# Patient Record
Sex: FEMALE | Race: WHITE | Employment: UNEMPLOYED | URBAN - METROPOLITAN AREA
[De-identification: names, ages, dates, MRNs, and addresses within clinical notes are randomized per-mention and may not be internally consistent; named-entity substitution may affect disease eponyms.]

---

## 2017-01-05 ENCOUNTER — POSTPARTUM (OUTPATIENT)
Dept: OBGYN CLINIC | Facility: CLINIC | Age: 32
End: 2017-01-05

## 2017-01-05 VITALS
WEIGHT: 171 LBS | SYSTOLIC BLOOD PRESSURE: 134 MMHG | BODY MASS INDEX: 26 KG/M2 | HEART RATE: 71 BPM | DIASTOLIC BLOOD PRESSURE: 85 MMHG

## 2017-01-05 RX ORDER — ASCORBIC ACID, CALCIUM CITRATE, IRON, VITAMIN D, DL- ALPHA- TOCOPHEROL ACETATE, THIAMINE, RIBOFLAVIN, NIACINAMIDE, PYRIDOXINE HYDROCHLORIDE, FOLIC ACID, IODINE, ZINC, COPPER, DOCUSATE SODIUM, DOCONEXENT AND ICOSAPENT
KIT
COMMUNITY
Start: 2017-01-04 | End: 2018-03-14 | Stop reason: ALTCHOICE

## 2017-01-06 ENCOUNTER — TELEPHONE (OUTPATIENT)
Dept: OBGYN CLINIC | Facility: CLINIC | Age: 32
End: 2017-01-06

## 2017-01-06 NOTE — PROGRESS NOTES
HPI:   Severiano Diener is a 27year old  who presents for a 2 week Postpartum visit. Pt accompanied by FOB and baby. Reports adapting well and coping well. Is very happy with her birth experience. Breastfeeding successfully.  No strain in relati not apply Misc Please supply patient with double electric breast pump covered by her insurance.  Disp: 1 each Rfl: 0      Past Medical History   Diagnosis Date   • Decorative tattoo    • History of chicken pox      age 10   • Human papilloma virus infection deferred      ASSESSMENT AND PLAN:     Advised to continue breastfeeding  Counseled on continued napping during day, and waiting to start exercise or sexual activity until 6 weeks PP  Advised to continue with PNV and Vitamin D3 2000 IU daily while nursing

## 2017-02-07 ENCOUNTER — POSTPARTUM (OUTPATIENT)
Dept: OBGYN CLINIC | Facility: CLINIC | Age: 32
End: 2017-02-07

## 2017-02-07 VITALS
SYSTOLIC BLOOD PRESSURE: 118 MMHG | BODY MASS INDEX: 25.61 KG/M2 | HEART RATE: 72 BPM | DIASTOLIC BLOOD PRESSURE: 79 MMHG | WEIGHT: 165.13 LBS | HEIGHT: 67.5 IN

## 2017-02-07 DIAGNOSIS — R10.2 PERINEAL PAIN: Primary | ICD-10-CM

## 2017-02-07 PROCEDURE — 99213 OFFICE O/P EST LOW 20 MIN: CPT | Performed by: ADVANCED PRACTICE MIDWIFE

## 2017-02-07 NOTE — PROGRESS NOTES
Patient here for postpartum check-up. Vaginal delivery @ 6 weeks ago with CNM . Breastfeeding exclusively, on demand. Baby with adequate weight gain. Denies fevers, chills, body aches and flu-like symptoms.   Denies abdominal pain, n, reports no vaginal b

## 2017-02-27 ENCOUNTER — NURSE ONLY (OUTPATIENT)
Dept: LACTATION | Facility: HOSPITAL | Age: 32
End: 2017-02-27
Payer: COMMERCIAL

## 2017-02-27 PROCEDURE — 99212 OFFICE O/P EST SF 10 MIN: CPT

## 2017-02-27 NOTE — PROGRESS NOTES
Mom is here today because she started weaning infant from nipple shield at the breast. Infant fed well at the breast on Friday without a nipple shield, however, on Saturday, Mom c/o pain with feeding and infant not wanting to stay latched to the breast. At

## 2017-04-06 ENCOUNTER — TELEPHONE (OUTPATIENT)
Dept: OBGYN CLINIC | Facility: CLINIC | Age: 32
End: 2017-04-06

## 2017-04-06 NOTE — TELEPHONE ENCOUNTER
Pt just weaned infant from nipple shield 2 weeks ago. Now feeling more discomfort with nursing & has blisters on her nipples, right worse that left. Encouraged pt to ensure infant has correct latch, as that can cause blistering.  Pt aware of what a correct

## 2017-04-06 NOTE — TELEPHONE ENCOUNTER
Per the pt she has a milk blister on her nipple, and would like to speak with a nurse. Please advise.

## 2017-04-07 ENCOUNTER — LAB ENCOUNTER (OUTPATIENT)
Dept: LAB | Facility: HOSPITAL | Age: 32
End: 2017-04-07
Attending: OBSTETRICS & GYNECOLOGY
Payer: COMMERCIAL

## 2017-04-07 DIAGNOSIS — Z34.03 ENCOUNTER FOR SUPERVISION OF NORMAL FIRST PREGNANCY IN THIRD TRIMESTER: ICD-10-CM

## 2017-04-07 DIAGNOSIS — O26.892 RH NEGATIVE STATE IN ANTEPARTUM PERIOD, SECOND TRIMESTER: ICD-10-CM

## 2017-04-07 DIAGNOSIS — Z67.91 RH NEGATIVE STATE IN ANTEPARTUM PERIOD, SECOND TRIMESTER: ICD-10-CM

## 2017-04-07 PROCEDURE — 36415 COLL VENOUS BLD VENIPUNCTURE: CPT

## 2017-04-07 PROCEDURE — 86901 BLOOD TYPING SEROLOGIC RH(D): CPT

## 2017-04-07 PROCEDURE — 85025 COMPLETE CBC W/AUTO DIFF WBC: CPT

## 2017-04-07 PROCEDURE — 85730 THROMBOPLASTIN TIME PARTIAL: CPT

## 2017-04-07 PROCEDURE — 80053 COMPREHEN METABOLIC PANEL: CPT

## 2017-04-07 PROCEDURE — 85610 PROTHROMBIN TIME: CPT

## 2017-04-07 PROCEDURE — 86900 BLOOD TYPING SEROLOGIC ABO: CPT

## 2017-04-14 ENCOUNTER — SURGERY (OUTPATIENT)
Age: 32
End: 2017-04-14

## 2017-04-14 ENCOUNTER — HOSPITAL ENCOUNTER (OUTPATIENT)
Facility: HOSPITAL | Age: 32
Setting detail: HOSPITAL OUTPATIENT SURGERY
Discharge: HOME | End: 2017-04-14
Attending: OBSTETRICS & GYNECOLOGY | Admitting: OBSTETRICS & GYNECOLOGY
Payer: COMMERCIAL

## 2017-04-14 ENCOUNTER — ANESTHESIA EVENT (OUTPATIENT)
Dept: SURGERY | Facility: HOSPITAL | Age: 32
End: 2017-04-14

## 2017-04-14 ENCOUNTER — ANESTHESIA (OUTPATIENT)
Dept: SURGERY | Facility: HOSPITAL | Age: 32
End: 2017-04-14

## 2017-04-14 VITALS
DIASTOLIC BLOOD PRESSURE: 74 MMHG | HEART RATE: 75 BPM | BODY MASS INDEX: 24.55 KG/M2 | WEIGHT: 162 LBS | HEIGHT: 68 IN | TEMPERATURE: 98 F | OXYGEN SATURATION: 100 % | RESPIRATION RATE: 19 BRPM | SYSTOLIC BLOOD PRESSURE: 126 MMHG

## 2017-04-14 DIAGNOSIS — N87.9 CERVICAL DYSPLASIA: Primary | ICD-10-CM

## 2017-04-14 PROCEDURE — 81025 URINE PREGNANCY TEST: CPT

## 2017-04-14 PROCEDURE — 0UJH8ZZ INSPECTION OF VAGINA AND CUL-DE-SAC, VIA NATURAL OR ARTIFICIAL OPENING ENDOSCOPIC: ICD-10-PCS | Performed by: OBSTETRICS & GYNECOLOGY

## 2017-04-14 PROCEDURE — 88307 TISSUE EXAM BY PATHOLOGIST: CPT | Performed by: OBSTETRICS & GYNECOLOGY

## 2017-04-14 PROCEDURE — 0UBC7ZX EXCISION OF CERVIX, VIA NATURAL OR ARTIFICIAL OPENING, DIAGNOSTIC: ICD-10-PCS | Performed by: OBSTETRICS & GYNECOLOGY

## 2017-04-14 PROCEDURE — 88305 TISSUE EXAM BY PATHOLOGIST: CPT | Performed by: OBSTETRICS & GYNECOLOGY

## 2017-04-14 RX ORDER — MIDAZOLAM HYDROCHLORIDE 1 MG/ML
INJECTION INTRAMUSCULAR; INTRAVENOUS AS NEEDED
Status: DISCONTINUED | OUTPATIENT
Start: 2017-04-14 | End: 2017-04-14 | Stop reason: SURG

## 2017-04-14 RX ORDER — ONDANSETRON 2 MG/ML
4 INJECTION INTRAMUSCULAR; INTRAVENOUS EVERY 8 HOURS PRN
Status: DISCONTINUED | OUTPATIENT
Start: 2017-04-14 | End: 2017-04-14

## 2017-04-14 RX ORDER — FERRIC SUBSULFATE 20-22G/100
SOLUTION, NON-ORAL MISCELLANEOUS AS NEEDED
Status: DISCONTINUED | OUTPATIENT
Start: 2017-04-14 | End: 2017-04-14 | Stop reason: HOSPADM

## 2017-04-14 RX ORDER — METOCLOPRAMIDE 10 MG/1
10 TABLET ORAL ONCE
Status: DISCONTINUED | OUTPATIENT
Start: 2017-04-14 | End: 2017-04-14 | Stop reason: HOSPADM

## 2017-04-14 RX ORDER — SODIUM CHLORIDE, SODIUM LACTATE, POTASSIUM CHLORIDE, CALCIUM CHLORIDE 600; 310; 30; 20 MG/100ML; MG/100ML; MG/100ML; MG/100ML
INJECTION, SOLUTION INTRAVENOUS CONTINUOUS
Status: DISCONTINUED | OUTPATIENT
Start: 2017-04-14 | End: 2017-04-14

## 2017-04-14 RX ORDER — ACETAMINOPHEN 325 MG/1
650 TABLET ORAL ONCE
Status: COMPLETED | OUTPATIENT
Start: 2017-04-14 | End: 2017-04-14

## 2017-04-14 RX ORDER — HALOPERIDOL 5 MG/ML
0.25 INJECTION INTRAMUSCULAR ONCE AS NEEDED
Status: DISCONTINUED | OUTPATIENT
Start: 2017-04-14 | End: 2017-04-14

## 2017-04-14 RX ORDER — DEXAMETHASONE SODIUM PHOSPHATE 4 MG/ML
VIAL (ML) INJECTION AS NEEDED
Status: DISCONTINUED | OUTPATIENT
Start: 2017-04-14 | End: 2017-04-14 | Stop reason: SURG

## 2017-04-14 RX ORDER — HYDROMORPHONE HYDROCHLORIDE 1 MG/ML
0.4 INJECTION, SOLUTION INTRAMUSCULAR; INTRAVENOUS; SUBCUTANEOUS EVERY 5 MIN PRN
Status: DISCONTINUED | OUTPATIENT
Start: 2017-04-14 | End: 2017-04-14

## 2017-04-14 RX ORDER — FAMOTIDINE 20 MG/1
20 TABLET ORAL ONCE
Status: DISCONTINUED | OUTPATIENT
Start: 2017-04-14 | End: 2017-04-14 | Stop reason: HOSPADM

## 2017-04-14 RX ORDER — KETOROLAC TROMETHAMINE 30 MG/ML
INJECTION, SOLUTION INTRAMUSCULAR; INTRAVENOUS AS NEEDED
Status: DISCONTINUED | OUTPATIENT
Start: 2017-04-14 | End: 2017-04-14 | Stop reason: SURG

## 2017-04-14 RX ORDER — ONDANSETRON 2 MG/ML
4 INJECTION INTRAMUSCULAR; INTRAVENOUS ONCE AS NEEDED
Status: DISCONTINUED | OUTPATIENT
Start: 2017-04-14 | End: 2017-04-14

## 2017-04-14 RX ORDER — NALOXONE HYDROCHLORIDE 0.4 MG/ML
80 INJECTION, SOLUTION INTRAMUSCULAR; INTRAVENOUS; SUBCUTANEOUS AS NEEDED
Status: DISCONTINUED | OUTPATIENT
Start: 2017-04-14 | End: 2017-04-14

## 2017-04-14 RX ORDER — MORPHINE SULFATE 4 MG/ML
4 INJECTION, SOLUTION INTRAMUSCULAR; INTRAVENOUS EVERY 10 MIN PRN
Status: DISCONTINUED | OUTPATIENT
Start: 2017-04-14 | End: 2017-04-14

## 2017-04-14 RX ORDER — ONDANSETRON 2 MG/ML
INJECTION INTRAMUSCULAR; INTRAVENOUS AS NEEDED
Status: DISCONTINUED | OUTPATIENT
Start: 2017-04-14 | End: 2017-04-14 | Stop reason: SURG

## 2017-04-14 RX ORDER — MORPHINE SULFATE 2 MG/ML
2 INJECTION, SOLUTION INTRAMUSCULAR; INTRAVENOUS EVERY 10 MIN PRN
Status: DISCONTINUED | OUTPATIENT
Start: 2017-04-14 | End: 2017-04-14

## 2017-04-14 RX ORDER — LIDOCAINE HYDROCHLORIDE 10 MG/ML
INJECTION, SOLUTION EPIDURAL; INFILTRATION; INTRACAUDAL; PERINEURAL AS NEEDED
Status: DISCONTINUED | OUTPATIENT
Start: 2017-04-14 | End: 2017-04-14 | Stop reason: SURG

## 2017-04-14 RX ORDER — GLYCOPYRROLATE 0.2 MG/ML
INJECTION INTRAMUSCULAR; INTRAVENOUS AS NEEDED
Status: DISCONTINUED | OUTPATIENT
Start: 2017-04-14 | End: 2017-04-14 | Stop reason: SURG

## 2017-04-14 RX ORDER — MORPHINE SULFATE 10 MG/ML
6 INJECTION, SOLUTION INTRAMUSCULAR; INTRAVENOUS EVERY 10 MIN PRN
Status: DISCONTINUED | OUTPATIENT
Start: 2017-04-14 | End: 2017-04-14

## 2017-04-14 RX ORDER — HYDROCODONE BITARTRATE AND ACETAMINOPHEN 5; 325 MG/1; MG/1
2 TABLET ORAL AS NEEDED
Status: COMPLETED | OUTPATIENT
Start: 2017-04-14 | End: 2017-04-14

## 2017-04-14 RX ORDER — SODIUM CHLORIDE, SODIUM LACTATE, POTASSIUM CHLORIDE, CALCIUM CHLORIDE 600; 310; 30; 20 MG/100ML; MG/100ML; MG/100ML; MG/100ML
INJECTION, SOLUTION INTRAVENOUS CONTINUOUS PRN
Status: DISCONTINUED | OUTPATIENT
Start: 2017-04-14 | End: 2017-04-14 | Stop reason: SURG

## 2017-04-14 RX ORDER — ONDANSETRON 4 MG/1
4 TABLET, FILM COATED ORAL EVERY 8 HOURS PRN
Status: DISCONTINUED | OUTPATIENT
Start: 2017-04-14 | End: 2017-04-14

## 2017-04-14 RX ORDER — HYDROMORPHONE HYDROCHLORIDE 1 MG/ML
0.6 INJECTION, SOLUTION INTRAMUSCULAR; INTRAVENOUS; SUBCUTANEOUS EVERY 5 MIN PRN
Status: DISCONTINUED | OUTPATIENT
Start: 2017-04-14 | End: 2017-04-14

## 2017-04-14 RX ORDER — HYDROMORPHONE HYDROCHLORIDE 1 MG/ML
0.2 INJECTION, SOLUTION INTRAMUSCULAR; INTRAVENOUS; SUBCUTANEOUS EVERY 5 MIN PRN
Status: DISCONTINUED | OUTPATIENT
Start: 2017-04-14 | End: 2017-04-14

## 2017-04-14 RX ORDER — IODINE SOLUTION STRONG 5% (LUGOL'S) 5 %
SOLUTION ORAL AS NEEDED
Status: DISCONTINUED | OUTPATIENT
Start: 2017-04-14 | End: 2017-04-14 | Stop reason: HOSPADM

## 2017-04-14 RX ORDER — ACETIC ACID 0.25 G/100ML
IRRIGANT IRRIGATION AS NEEDED
Status: DISCONTINUED | OUTPATIENT
Start: 2017-04-14 | End: 2017-04-14 | Stop reason: HOSPADM

## 2017-04-14 RX ORDER — SODIUM CHLORIDE, SODIUM LACTATE, POTASSIUM CHLORIDE, CALCIUM CHLORIDE 600; 310; 30; 20 MG/100ML; MG/100ML; MG/100ML; MG/100ML
INJECTION, SOLUTION INTRAVENOUS CONTINUOUS PRN
Status: DISCONTINUED | OUTPATIENT
Start: 2017-04-14 | End: 2017-04-14

## 2017-04-14 RX ORDER — HYDROCODONE BITARTRATE AND ACETAMINOPHEN 5; 325 MG/1; MG/1
1 TABLET ORAL AS NEEDED
Status: COMPLETED | OUTPATIENT
Start: 2017-04-14 | End: 2017-04-14

## 2017-04-14 RX ADMIN — ONDANSETRON 4 MG: 2 INJECTION INTRAMUSCULAR; INTRAVENOUS at 07:35:00

## 2017-04-14 RX ADMIN — MIDAZOLAM HYDROCHLORIDE 2 MG: 1 INJECTION INTRAMUSCULAR; INTRAVENOUS at 07:32:00

## 2017-04-14 RX ADMIN — DEXAMETHASONE SODIUM PHOSPHATE 4 MG: 4 MG/ML VIAL (ML) INJECTION at 07:35:00

## 2017-04-14 RX ADMIN — SODIUM CHLORIDE, SODIUM LACTATE, POTASSIUM CHLORIDE, CALCIUM CHLORIDE: 600; 310; 30; 20 INJECTION, SOLUTION INTRAVENOUS at 07:30:00

## 2017-04-14 RX ADMIN — LIDOCAINE HYDROCHLORIDE 25 MG: 10 INJECTION, SOLUTION EPIDURAL; INFILTRATION; INTRACAUDAL; PERINEURAL at 07:35:00

## 2017-04-14 RX ADMIN — GLYCOPYRROLATE 0.2 MG: 0.2 INJECTION INTRAMUSCULAR; INTRAVENOUS at 07:35:00

## 2017-04-14 RX ADMIN — SODIUM CHLORIDE, SODIUM LACTATE, POTASSIUM CHLORIDE, CALCIUM CHLORIDE: 600; 310; 30; 20 INJECTION, SOLUTION INTRAVENOUS at 08:31:00

## 2017-04-14 RX ADMIN — KETOROLAC TROMETHAMINE 30 MG: 30 INJECTION, SOLUTION INTRAMUSCULAR; INTRAVENOUS at 08:21:00

## 2017-04-14 NOTE — ANESTHESIA POSTPROCEDURE EVALUATION
Patient: Cristi Duane    Procedure Summary     Date Anesthesia Start Anesthesia Stop Room / Location    04/14/17 0730  [facility] MAIN OR 08 / [address] [facility] MAIN OR       Procedure Diagnosis Surgeon Responsible Provider    PELVIC EXAM (N/A ); CERVIX BIOPSY (N/A ) (c

## 2017-04-14 NOTE — ANESTHESIA PREPROCEDURE EVALUATION
Anesthesia PreOp Note    HPI:     Claudio Gonzalez is a 27year old female who presents for preoperative consultation requested by: Gary Taylor DO    Date of Surgery: 4/14/2017    Procedure(s):  PELVIC EXAM  CERVIX BIOPSY  Indication: cervical dys mL/hr at 04/14/17 0625   [MAR Hold] famoTIDine (PEPCID) tab 20 mg 20 mg Oral Once David Paddy, DO 20 mg at 04/14/17 0600   [MAR Hold] Metoclopramide HCl (REGLAN) tab 10 mg 10 mg Oral Once David Paddy, DO 10 mg at 04/14/17 0600   [MAR Hold] CeFAZo Sexual Activity: Not on file   Not on file  Other Topics Concern    Blood Transfusions No    Caffeine Concern No    Comment: 1-2 cup coffee    Occupational Exposure No    Weight Concern No    Special Diet No    Comment: recently added daily back into he General  Airway:  LMA  Post-op Pain Management: IV analgesics  Informed Consent Plan and Risks Discussed With:  Patient and spouse  Discussed plan with:  Attending and surgeon  Provider Attestation (if preop done by other):  Patient seen, postpartum , br

## 2017-04-14 NOTE — INTERVAL H&P NOTE
Pre-op Diagnosis: cervical dysplasia    The above referenced H&P was reviewed by Cora Guzman DO on 4/14/2017, the patient was examined and no significant changes have occurred in the patient's condition since the H&P was performed.   I discussed with shellie was performed with any pertinent positives noted in the HPI and otherwise negative.     VITAL SIGNS:    Blood pressure: 120/78, Pulse: 70, Temperature: , Respirations: , O2 sat: , Pain Scale: 0, Height: 68 in, Weight: 168 lb, BSA: 1.9, BMI: 25.54 kg/m2    P for cervical cerclage in the future secondary to procedure. I reviewed other risks and benefits associated with this. I discussed that she cannot breast feed while on Narcotics.  I advised that after the [facility], she should wait at least 6 months to become preg

## 2017-04-14 NOTE — BRIEF OP NOTE
[address] UNIT  Brief Op Note     Johanna Staley Location: OR   [facility] [id number] MRN [id number]   Admission Date 4/14/2017 Operation Date 4/14/2017   Attending Physician Delgado Doe, [address] [place] [address] Operating Physician Margarita Castano

## 2017-04-15 NOTE — OPERATIVE REPORT
[facility]    PATIENT'S NAME: [facility]   ATTENDING PHYSICIAN: Miguelito Mirza DO   OPERATING PHYSICIAN: Miguelito Mirza DO   PATIENT ACCOUNT#:   [de-identified]    LOCATION:  [place] [address]  MEDICAL RECORD #:   [id number]       BRYSON the way around. Next, the specimen was then grasped with an Allis clamp and curved Flores scissors were then used to transect as small a lesion as possible of the endo and ectocervix, given the fact that the patient so wanted to have more children.   This sp

## 2017-06-15 PROCEDURE — 81015 MICROSCOPIC EXAM OF URINE: CPT | Performed by: INTERNAL MEDICINE

## 2017-06-15 PROCEDURE — 87186 SC STD MICRODIL/AGAR DIL: CPT | Performed by: INTERNAL MEDICINE

## 2017-06-15 PROCEDURE — 87086 URINE CULTURE/COLONY COUNT: CPT | Performed by: INTERNAL MEDICINE

## 2017-06-15 PROCEDURE — 87077 CULTURE AEROBIC IDENTIFY: CPT | Performed by: INTERNAL MEDICINE

## 2017-09-06 ENCOUNTER — TELEPHONE (OUTPATIENT)
Dept: OBGYN CLINIC | Facility: CLINIC | Age: 32
End: 2017-09-06

## 2017-12-26 ENCOUNTER — OFFICE VISIT (OUTPATIENT)
Dept: OBGYN CLINIC | Facility: CLINIC | Age: 32
End: 2017-12-26

## 2017-12-26 ENCOUNTER — TELEPHONE (OUTPATIENT)
Dept: OBGYN CLINIC | Facility: CLINIC | Age: 32
End: 2017-12-26

## 2017-12-26 ENCOUNTER — TELEPHONE (OUTPATIENT)
Dept: PEDIATRICS CLINIC | Facility: CLINIC | Age: 32
End: 2017-12-26

## 2017-12-26 VITALS
HEIGHT: 68 IN | WEIGHT: 146.25 LBS | HEART RATE: 87 BPM | BODY MASS INDEX: 22.17 KG/M2 | SYSTOLIC BLOOD PRESSURE: 119 MMHG | DIASTOLIC BLOOD PRESSURE: 78 MMHG

## 2017-12-26 DIAGNOSIS — Z98.890 HISTORY OF LOOP ELECTRICAL EXCISION PROCEDURE (LEEP): ICD-10-CM

## 2017-12-26 DIAGNOSIS — N92.6 MISSED MENSES: Primary | ICD-10-CM

## 2017-12-26 DIAGNOSIS — O09.891 SHORT INTERVAL BETWEEN PREGNANCIES AFFECTING PREGNANCY IN FIRST TRIMESTER, ANTEPARTUM: ICD-10-CM

## 2017-12-26 PROCEDURE — 81025 URINE PREGNANCY TEST: CPT | Performed by: ADVANCED PRACTICE MIDWIFE

## 2017-12-26 PROCEDURE — 99213 OFFICE O/P EST LOW 20 MIN: CPT | Performed by: ADVANCED PRACTICE MIDWIFE

## 2017-12-26 NOTE — TELEPHONE ENCOUNTER
Pt states she was taking Citranatal Assure with her last pregnancy and was happy with this formula. Pt was advised we recommend pt's  OTC PN Vitamin with DHA to take during pregnancy.   Pt was advised to check with the pharmacy to see which is more

## 2017-12-26 NOTE — PROGRESS NOTES
Pt is a  sure of lmp. Pt has concerns due to recent LEEP and short inter conceptualspacing.   1.  Discussed importance of folic acid, calcium, vitamin D.   2.  Reviewed pregnancy recommendations regarding weight gain, diet, fish consumption, consumptio

## 2018-01-23 ENCOUNTER — TELEPHONE (OUTPATIENT)
Dept: OBGYN CLINIC | Facility: CLINIC | Age: 33
End: 2018-01-23

## 2018-01-23 ENCOUNTER — NURSE ONLY (OUTPATIENT)
Dept: OBGYN CLINIC | Facility: CLINIC | Age: 33
End: 2018-01-23

## 2018-01-23 VITALS — WEIGHT: 136 LBS | BODY MASS INDEX: 21 KG/M2

## 2018-01-23 DIAGNOSIS — O34.41 HISTORY OF LOOP ELECTROSURGICAL EXCISION PROCEDURE (LEEP) OF CERVIX AFFECTING PREGNANCY IN FIRST TRIMESTER: ICD-10-CM

## 2018-01-23 DIAGNOSIS — Z98.890 HISTORY OF LOOP ELECTROSURGICAL EXCISION PROCEDURE (LEEP) OF CERVIX AFFECTING PREGNANCY IN FIRST TRIMESTER: ICD-10-CM

## 2018-01-23 DIAGNOSIS — Z34.81 PRENATAL CARE, SUBSEQUENT PREGNANCY IN FIRST TRIMESTER: Primary | ICD-10-CM

## 2018-01-23 DIAGNOSIS — O09.891 SHORT INTERVAL BETWEEN PREGNANCIES AFFECTING PREGNANCY IN FIRST TRIMESTER, ANTEPARTUM: ICD-10-CM

## 2018-01-23 NOTE — TELEPHONE ENCOUNTER
Per pt she knows for fact she had a [facility] PC appt scheduled for today at 11am - she was never added to the schedule

## 2018-01-23 NOTE — PROGRESS NOTES
Nurse education complete via phone. Pt to  handouts & folder at next visit. Pt has a cat at home (does not clean litterbox). Toxo titers ordered w/ NO[facility] labs. Pt req to have lab work completed at outside lab. Orders printed & mailed to pt.  Address co

## 2018-02-05 ENCOUNTER — TELEPHONE (OUTPATIENT)
Dept: OBGYN CLINIC | Facility: CLINIC | Age: 33
End: 2018-02-05

## 2018-02-05 NOTE — TELEPHONE ENCOUNTER
Received call from pt who states for the past 10 min she is having issues with her vision. Pt states she has \"blurry vision and is seeing flickering lights. \" Pt denies SCHAEFER states she just feels pressure in the back of her head.  Pt denies numbness or weakn

## 2018-02-10 LAB
ABSOLUTE BASOPHILS: 16 CELLS/UL (ref 0–200)
ABSOLUTE EOSINOPHILS: 73 CELLS/UL (ref 15–500)
ABSOLUTE LYMPHOCYTES: 1580 CELLS/UL (ref 850–3900)
ABSOLUTE MONOCYTES: 437 CELLS/UL (ref 200–950)
ABSOLUTE NEUTROPHILS: 5994 CELLS/UL (ref 1500–7800)
BASOPHILS: 0.2 %
EOSINOPHILS: 0.9 %
HEMATOCRIT: 35.9 % (ref 35–45)
HEMOGLOBIN: 12 G/DL (ref 11.7–15.5)
LYMPHOCYTES: 19.5 %
MCH: 30.2 PG (ref 27–33)
MCHC: 33.4 G/DL (ref 32–36)
MCV: 90.4 FL (ref 80–100)
MONOCYTES: 5.4 %
MPV: 12.1 FL (ref 7.5–12.5)
NEUTROPHILS: 74 %
PLATELET COUNT: 214 THOUSAND/UL (ref 140–400)
RDW: 12.4 % (ref 11–15)
RED BLOOD CELL COUNT: 3.97 MILLION/UL (ref 3.8–5.1)
RUBELLA ANTIBODY (IGG): 1.69 INDEX
SIGNAL TO CUT-OFF: 0.02
T. PALLIDUM AB, EIA: NEGATIVE
TOXOPLASMA IGG ANTIBODY: <7.2 IU/ML
TOXOPLASMA IGM ANTIBODY: <8 AU/ML
WHITE BLOOD CELL COUNT: 8.1 THOUSAND/UL (ref 3.8–10.8)

## 2018-02-12 ENCOUNTER — TELEPHONE (OUTPATIENT)
Dept: OBGYN CLINIC | Facility: CLINIC | Age: 33
End: 2018-02-12

## 2018-02-14 ENCOUNTER — INITIAL PRENATAL (OUTPATIENT)
Dept: OBGYN CLINIC | Facility: CLINIC | Age: 33
End: 2018-02-14

## 2018-02-14 VITALS — DIASTOLIC BLOOD PRESSURE: 64 MMHG | BODY MASS INDEX: 23 KG/M2 | SYSTOLIC BLOOD PRESSURE: 112 MMHG | WEIGHT: 150.81 LBS

## 2018-02-14 DIAGNOSIS — Z34.81 ENCOUNTER FOR SUPERVISION OF OTHER NORMAL PREGNANCY IN FIRST TRIMESTER: Primary | ICD-10-CM

## 2018-02-14 DIAGNOSIS — A64 STI (SEXUALLY TRANSMITTED INFECTION): ICD-10-CM

## 2018-02-14 LAB
MULTISTIX LOT#: NORMAL NUMERIC
PH, URINE: 8 (ref 4.5–8)
SPECIFIC GRAVITY: 1.01 (ref 1–1.03)
URINE-COLOR: YELLOW
UROBILINOGEN,SEMI-QN: 0.2 MG/DL (ref 0–1.9)

## 2018-02-14 PROCEDURE — 81002 URINALYSIS NONAUTO W/O SCOPE: CPT | Performed by: ADVANCED PRACTICE MIDWIFE

## 2018-02-14 RX ORDER — CALCIUM CARBONATE/VITAMIN D3 600 MG-10
TABLET ORAL
Status: ON HOLD | COMMUNITY
Start: 2018-01-16 | End: 2018-09-08

## 2018-02-14 RX ORDER — CHOLECALCIFEROL (VITAMIN D3) 50 MCG
TABLET ORAL
COMMUNITY
Start: 2018-01-16

## 2018-02-14 NOTE — PROGRESS NOTES
NOB teaching reviewed. First trimester screen scheduled for Friday  Pt has increased nausea. Information sheets given, warning signs reviewed. Hx of LEEP  Cervical length measurement consulted on. Pt to discuss with MFM on Friday.   All questions answer

## 2018-02-15 LAB
C TRACH DNA SPEC QL NAA+PROBE: NEGATIVE
N GONORRHOEA DNA SPEC QL NAA+PROBE: NEGATIVE

## 2018-02-16 ENCOUNTER — HOSPITAL ENCOUNTER (OUTPATIENT)
Dept: PERINATAL CARE | Facility: HOSPITAL | Age: 33
Discharge: HOME | End: 2018-02-16
Attending: ADVANCED PRACTICE MIDWIFE
Payer: COMMERCIAL

## 2018-02-16 ENCOUNTER — HOSPITAL ENCOUNTER (OUTPATIENT)
Dept: PERINATAL CARE | Facility: HOSPITAL | Age: 33
Discharge: HOME | End: 2018-02-16
Attending: OBSTETRICS & GYNECOLOGY
Payer: COMMERCIAL

## 2018-02-16 VITALS — HEART RATE: 79 BPM | DIASTOLIC BLOOD PRESSURE: 58 MMHG | SYSTOLIC BLOOD PRESSURE: 137 MMHG

## 2018-02-16 DIAGNOSIS — Z36.9 FIRST TRIMESTER SCREENING: ICD-10-CM

## 2018-02-16 DIAGNOSIS — O09.891 SHORT INTERVAL BETWEEN PREGNANCIES AFFECTING PREGNANCY IN FIRST TRIMESTER, ANTEPARTUM: ICD-10-CM

## 2018-02-16 DIAGNOSIS — O34.40: ICD-10-CM

## 2018-02-16 DIAGNOSIS — Z80.43 FH: TESTICULAR CANCER: ICD-10-CM

## 2018-02-16 DIAGNOSIS — N87.1: ICD-10-CM

## 2018-02-16 DIAGNOSIS — Z98.890 HISTORY OF LOOP ELECTRICAL EXCISION PROCEDURE (LEEP): ICD-10-CM

## 2018-02-16 DIAGNOSIS — Z36.9 FIRST TRIMESTER SCREENING: Primary | ICD-10-CM

## 2018-02-16 PROCEDURE — 76813 OB US NUCHAL MEAS 1 GEST: CPT | Performed by: OBSTETRICS & GYNECOLOGY

## 2018-02-16 PROCEDURE — 99243 OFF/OP CNSLTJ NEW/EST LOW 30: CPT | Performed by: OBSTETRICS & GYNECOLOGY

## 2018-02-16 NOTE — PROGRESS NOTES
Outpatient Maternal-Fetal Medicine Consultation    Dear Ms. Ari carlson for requesting ultrasound evaluation and maternal fetal medicine consultation on your patient Óscar Hansen.   As you are aware she is a 34year old female with a Single Carbonate Antacid 1000 MG Oral Chew Tab, Chew 500 mg by mouth 2 (two) times daily.   , Disp: , Rfl:   Allergies: No Known Allergies      PHYSICAL EXAMINATION:  /58   Pulse 79   LMP 11/24/2017 (Exact Date)   General: alert and oriented,no acute distres short cervix. The data in twin pregnancies is not as promising but one study did show a trend toward lower  delivery with vaginal progesterone use.        I recommend a baseline cervical length ~ 16 weeks gestation then repeat assessment between 25- ajith Boothe M.D. The majority of the time (>50%) was spent in review of records, consultation and coordination of care. Our discussion is summarized above. The approximate physician face-to-face time was 40 minutes.

## 2018-02-20 LAB — HPV I/H RISK 1 DNA SPEC QL NAA+PROBE: NEGATIVE

## 2018-02-22 ENCOUNTER — TELEPHONE (OUTPATIENT)
Dept: PEDIATRICS CLINIC | Facility: CLINIC | Age: 33
End: 2018-02-22

## 2018-03-01 NOTE — TELEPHONE ENCOUNTER
Per [facility] pt's pap results are to be faxed to Dr Andre Hernandez. Pap results were faxed to Dr Andre Hernandez on 2/27/18.

## 2018-03-02 ENCOUNTER — TELEPHONE (OUTPATIENT)
Dept: PERINATAL CARE | Facility: HOSPITAL | Age: 33
End: 2018-03-02

## 2018-03-02 NOTE — TELEPHONE ENCOUNTER
Pt 1st trimester screen results obt  Reviewed By MD allen  Low risk for trisomy 18/13/21  Less than 1 in 34772 risk for trisomy 18/13/21    Report sent for scanning into pt record

## 2018-03-12 ENCOUNTER — TELEPHONE (OUTPATIENT)
Dept: OBGYN CLINIC | Facility: CLINIC | Age: 33
End: 2018-03-12

## 2018-03-14 ENCOUNTER — ROUTINE PRENATAL (OUTPATIENT)
Dept: OBGYN CLINIC | Facility: CLINIC | Age: 33
End: 2018-03-14

## 2018-03-14 VITALS
DIASTOLIC BLOOD PRESSURE: 62 MMHG | HEART RATE: 70 BPM | SYSTOLIC BLOOD PRESSURE: 102 MMHG | WEIGHT: 153.13 LBS | BODY MASS INDEX: 23.21 KG/M2 | HEIGHT: 68 IN

## 2018-03-14 DIAGNOSIS — Z34.82 PRENATAL CARE, SUBSEQUENT PREGNANCY, SECOND TRIMESTER: Primary | ICD-10-CM

## 2018-03-14 LAB
MULTISTIX LOT#: ABNORMAL NUMERIC
PH, URINE: 8.5 (ref 4.5–8)
SPECIFIC GRAVITY: 1.01 (ref 1–1.03)
URINE-COLOR: YELLOW
UROBILINOGEN,SEMI-QN: 0.2 MG/DL (ref 0–1.9)

## 2018-03-14 PROCEDURE — 81002 URINALYSIS NONAUTO W/O SCOPE: CPT | Performed by: ADVANCED PRACTICE MIDWIFE

## 2018-03-14 RX ORDER — SWAB
SWAB, NON-MEDICATED MISCELLANEOUS
COMMUNITY

## 2018-03-15 ENCOUNTER — HOSPITAL ENCOUNTER (OUTPATIENT)
Dept: PERINATAL CARE | Facility: HOSPITAL | Age: 33
Discharge: HOME | End: 2018-03-15
Attending: OBSTETRICS & GYNECOLOGY
Payer: COMMERCIAL

## 2018-03-15 VITALS
DIASTOLIC BLOOD PRESSURE: 67 MMHG | HEIGHT: 68 IN | BODY MASS INDEX: 23.19 KG/M2 | WEIGHT: 153 LBS | SYSTOLIC BLOOD PRESSURE: 116 MMHG | HEART RATE: 84 BPM

## 2018-03-15 DIAGNOSIS — O09.891 SHORT INTERVAL BETWEEN PREGNANCIES AFFECTING PREGNANCY IN FIRST TRIMESTER, ANTEPARTUM: ICD-10-CM

## 2018-03-15 DIAGNOSIS — Z98.890 HISTORY OF LOOP ELECTRICAL EXCISION PROCEDURE (LEEP): Primary | ICD-10-CM

## 2018-03-15 DIAGNOSIS — Z98.890 HISTORY OF LOOP ELECTRICAL EXCISION PROCEDURE (LEEP): ICD-10-CM

## 2018-03-15 PROCEDURE — 76817 TRANSVAGINAL US OBSTETRIC: CPT | Performed by: OBSTETRICS & GYNECOLOGY

## 2018-03-15 PROCEDURE — 99243 OFF/OP CNSLTJ NEW/EST LOW 30: CPT | Performed by: OBSTETRICS & GYNECOLOGY

## 2018-03-15 NOTE — PROGRESS NOTES
Outpatient Maternal-Fetal Medicine Consultation     Dear Ms. Johny Patel  Thank you for requesting ultrasound evaluation and maternal fetal medicine consultation on your patient Molly Tang.   As you are aware she is a 34year old female with a [employer] [place] are medical and surgical treatments to help reduce  delivery in women with short cervix.   Daily vaginal use of 200 mg micronized progesterone has been shown to reduce the risk of  delivery by ~ 40% in women with a chavez pregnancy and ultr

## 2018-04-02 ENCOUNTER — TELEPHONE (OUTPATIENT)
Dept: OBGYN CLINIC | Facility: CLINIC | Age: 33
End: 2018-04-02

## 2018-04-02 DIAGNOSIS — Z34.82 PRENATAL CARE, SUBSEQUENT PREGNANCY IN SECOND TRIMESTER: Primary | ICD-10-CM

## 2018-04-02 NOTE — TELEPHONE ENCOUNTER
Order placed for AFP at [facility] per pt request. Order faxed to [facility] in [facility], confirmed w/ pt

## 2018-04-07 ENCOUNTER — TELEPHONE (OUTPATIENT)
Dept: OBGYN CLINIC | Facility: CLINIC | Age: 33
End: 2018-04-07

## 2018-04-12 ENCOUNTER — ROUTINE PRENATAL (OUTPATIENT)
Dept: OBGYN CLINIC | Facility: CLINIC | Age: 33
End: 2018-04-12

## 2018-04-12 VITALS
SYSTOLIC BLOOD PRESSURE: 123 MMHG | WEIGHT: 157 LBS | HEART RATE: 73 BPM | DIASTOLIC BLOOD PRESSURE: 73 MMHG | BODY MASS INDEX: 24 KG/M2

## 2018-04-12 DIAGNOSIS — Z34.82 ENCOUNTER FOR SUPERVISION OF OTHER NORMAL PREGNANCY IN SECOND TRIMESTER: Primary | ICD-10-CM

## 2018-04-12 PROCEDURE — 81002 URINALYSIS NONAUTO W/O SCOPE: CPT | Performed by: ADVANCED PRACTICE MIDWIFE

## 2018-04-12 NOTE — PROGRESS NOTES
20 week ultrasound for CL and anatomy tomorrow. Feeling well. No complaints.   Will want to do 3rd T labs through [facility] - give orders at [facility]

## 2018-04-13 ENCOUNTER — HOSPITAL ENCOUNTER (OUTPATIENT)
Dept: PERINATAL CARE | Facility: HOSPITAL | Age: 33
Discharge: HOME | End: 2018-04-13
Attending: OBSTETRICS & GYNECOLOGY
Payer: COMMERCIAL

## 2018-04-13 VITALS — SYSTOLIC BLOOD PRESSURE: 124 MMHG | DIASTOLIC BLOOD PRESSURE: 60 MMHG

## 2018-04-13 DIAGNOSIS — Z36.3 ENCOUNTER FOR ANTENATAL SCREENING FOR MALFORMATION USING ULTRASOUND: ICD-10-CM

## 2018-04-13 DIAGNOSIS — O34.40: ICD-10-CM

## 2018-04-13 DIAGNOSIS — O09.891 SHORT INTERVAL BETWEEN PREGNANCIES AFFECTING PREGNANCY IN FIRST TRIMESTER, ANTEPARTUM: ICD-10-CM

## 2018-04-13 DIAGNOSIS — O34.42 H/O LEEP (LOOP ELECTROSURGICAL EXCISION PROCEDURE) OF CERVIX COMPLICATING PREGNANCY, SECOND TRIMESTER: ICD-10-CM

## 2018-04-13 DIAGNOSIS — Z98.890 HISTORY OF LOOP ELECTRICAL EXCISION PROCEDURE (LEEP): ICD-10-CM

## 2018-04-13 DIAGNOSIS — N87.1: ICD-10-CM

## 2018-04-13 DIAGNOSIS — Z98.890 H/O LEEP (LOOP ELECTROSURGICAL EXCISION PROCEDURE) OF CERVIX COMPLICATING PREGNANCY, SECOND TRIMESTER: Primary | ICD-10-CM

## 2018-04-13 DIAGNOSIS — Z98.890 H/O LEEP (LOOP ELECTROSURGICAL EXCISION PROCEDURE) OF CERVIX COMPLICATING PREGNANCY, SECOND TRIMESTER: ICD-10-CM

## 2018-04-13 DIAGNOSIS — O34.42 H/O LEEP (LOOP ELECTROSURGICAL EXCISION PROCEDURE) OF CERVIX COMPLICATING PREGNANCY, SECOND TRIMESTER: Primary | ICD-10-CM

## 2018-04-13 PROCEDURE — 76811 OB US DETAILED SNGL FETUS: CPT | Performed by: OBSTETRICS & GYNECOLOGY

## 2018-04-13 PROCEDURE — 76817 TRANSVAGINAL US OBSTETRIC: CPT | Performed by: OBSTETRICS & GYNECOLOGY

## 2018-04-13 PROCEDURE — 99244 OFF/OP CNSLTJ NEW/EST MOD 40: CPT | Performed by: OBSTETRICS & GYNECOLOGY

## 2018-04-13 NOTE — PROGRESS NOTES
Outpatient Maternal-Fetal Medicine Consultation     Dear Ms. Tamiko Richards  Thank you for requesting ultrasound evaluation and maternal fetal medicine consultation on your patient Annmarie Corbin you are aware she is a 34year old female with a [employer] [place] [employer] length, although transabdominal cervical length >36 mm correlates well with a transvaginal measurement greater than 25 mm.     Screening cervical length is clinically useful since there are medical and surgical treatments to help reduce  delivery in

## 2018-04-13 NOTE — ADDENDUM NOTE
Encounter addended by: Rosa Barth on: 4/13/2018 12:39 PM<BR>    Actions taken: Visit diagnoses modified, Charge Capture section accepted

## 2018-05-03 ENCOUNTER — ROUTINE PRENATAL (OUTPATIENT)
Dept: OBGYN CLINIC | Facility: CLINIC | Age: 33
End: 2018-05-03

## 2018-05-03 ENCOUNTER — TELEPHONE (OUTPATIENT)
Dept: OBGYN CLINIC | Facility: CLINIC | Age: 33
End: 2018-05-03

## 2018-05-03 VITALS
BODY MASS INDEX: 24.76 KG/M2 | HEIGHT: 68 IN | DIASTOLIC BLOOD PRESSURE: 68 MMHG | SYSTOLIC BLOOD PRESSURE: 116 MMHG | WEIGHT: 163.38 LBS | HEART RATE: 70 BPM

## 2018-05-03 DIAGNOSIS — Z34.82 PRENATAL CARE, SUBSEQUENT PREGNANCY, SECOND TRIMESTER: Primary | ICD-10-CM

## 2018-05-03 PROCEDURE — 87210 SMEAR WET MOUNT SALINE/INK: CPT | Performed by: ADVANCED PRACTICE MIDWIFE

## 2018-05-03 PROCEDURE — 81002 URINALYSIS NONAUTO W/O SCOPE: CPT | Performed by: ADVANCED PRACTICE MIDWIFE

## 2018-05-03 NOTE — TELEPHONE ENCOUNTER
Pt reports last 3 days experiencing pelvic pressure that comes & goes. States has also been feeling some rectal pressure. Denies any change in vag d/c. Denies dysuria.  Was advised by MFM to let office know if she experiences complaints commonly felt later

## 2018-05-10 ENCOUNTER — ROUTINE PRENATAL (OUTPATIENT)
Dept: OBGYN CLINIC | Facility: CLINIC | Age: 33
End: 2018-05-10

## 2018-05-10 VITALS
SYSTOLIC BLOOD PRESSURE: 118 MMHG | DIASTOLIC BLOOD PRESSURE: 71 MMHG | HEART RATE: 88 BPM | BODY MASS INDEX: 25 KG/M2 | WEIGHT: 164 LBS

## 2018-05-10 DIAGNOSIS — Z34.82 ENCOUNTER FOR SUPERVISION OF OTHER NORMAL PREGNANCY IN SECOND TRIMESTER: Primary | ICD-10-CM

## 2018-05-10 PROCEDURE — 81002 URINALYSIS NONAUTO W/O SCOPE: CPT | Performed by: ADVANCED PRACTICE MIDWIFE

## 2018-05-10 NOTE — PROGRESS NOTES
Doing well. Has been taking it easy since last week and pelvic pressure sensations have resolved. +FM. Discussed plan for 3rd T labs 2 days before next visit and then Rhogam at 28 week visit. Rev warnings/when to call.

## 2018-06-12 ENCOUNTER — ROUTINE PRENATAL (OUTPATIENT)
Dept: OBGYN CLINIC | Facility: CLINIC | Age: 33
End: 2018-06-12

## 2018-06-12 ENCOUNTER — TELEPHONE (OUTPATIENT)
Dept: OBGYN CLINIC | Facility: CLINIC | Age: 33
End: 2018-06-12

## 2018-06-12 VITALS
BODY MASS INDEX: 25.91 KG/M2 | HEART RATE: 76 BPM | WEIGHT: 171 LBS | HEIGHT: 68 IN | SYSTOLIC BLOOD PRESSURE: 108 MMHG | DIASTOLIC BLOOD PRESSURE: 68 MMHG

## 2018-06-12 DIAGNOSIS — Z34.83 PRENATAL CARE, SUBSEQUENT PREGNANCY, THIRD TRIMESTER: Primary | ICD-10-CM

## 2018-06-12 PROCEDURE — 90471 IMMUNIZATION ADMIN: CPT | Performed by: ADVANCED PRACTICE MIDWIFE

## 2018-06-12 PROCEDURE — 81002 URINALYSIS NONAUTO W/O SCOPE: CPT | Performed by: ADVANCED PRACTICE MIDWIFE

## 2018-06-12 PROCEDURE — 90715 TDAP VACCINE 7 YRS/> IM: CPT | Performed by: ADVANCED PRACTICE MIDWIFE

## 2018-06-13 ENCOUNTER — NURSE ONLY (OUTPATIENT)
Dept: OBGYN CLINIC | Facility: CLINIC | Age: 33
End: 2018-06-13

## 2018-06-13 ENCOUNTER — TELEPHONE (OUTPATIENT)
Dept: OBGYN CLINIC | Facility: CLINIC | Age: 33
End: 2018-06-13

## 2018-06-13 VITALS — HEART RATE: 93 BPM | SYSTOLIC BLOOD PRESSURE: 116 MMHG | DIASTOLIC BLOOD PRESSURE: 70 MMHG

## 2018-06-13 DIAGNOSIS — O26.893 RH NEGATIVE STATE IN ANTEPARTUM PERIOD, THIRD TRIMESTER: Primary | ICD-10-CM

## 2018-06-13 DIAGNOSIS — Z67.91 RH NEGATIVE STATE IN ANTEPARTUM PERIOD, THIRD TRIMESTER: Primary | ICD-10-CM

## 2018-06-13 PROCEDURE — 96372 THER/PROPH/DIAG INJ SC/IM: CPT | Performed by: ADVANCED PRACTICE MIDWIFE

## 2018-06-13 NOTE — TELEPHONE ENCOUNTER
Advised pt that results are back & she can schedule the Rhogam appt today or tomorrow. Pt req appt for 1100 today.  Pt verbalized an understanding & agrees w/ plan

## 2018-06-13 NOTE — PATIENT INSTRUCTIONS
If You Are Rh Negative – Routine Administration    If you’re Rh negative, ask your health care provider about getting treated with RhoGam or Rhophylac. Even if you miscarry or don’t deliver the baby, you will still need treatment.  The health of any baby as directed      Location, date and time:  6/13/18

## 2018-06-13 NOTE — PROGRESS NOTES
Prenatal patient in today for Routine Rhogam injection. Patient is 28w5d today. Patient without complaints. Injection given LUOQ. Patient tolerated well. Rhogam information sheet provided.  Patient instructed to contact office with any questions or concerns

## 2018-06-14 NOTE — PROGRESS NOTES
Baby active. Denies contractions. Is having more heaviness. Did labs at [facility], but no results. Needs Rhogam. Called [facility] to send results, but closed so message on automated machine to fax results.  Pt will call in morning to see if results in. Brijesh counts

## 2018-06-23 ENCOUNTER — TELEPHONE (OUTPATIENT)
Dept: OBGYN CLINIC | Facility: CLINIC | Age: 33
End: 2018-06-23

## 2018-06-23 NOTE — TELEPHONE ENCOUNTER
Pt states she noted some dry crustiness, possibly dry skin, on her right nipple this morning. After she showered, she noticed the area was bleeding. Is currently not nursing. Denies possibly snagging or accidentally scratching area.  Advised to watch for in

## 2018-06-25 ENCOUNTER — ROUTINE PRENATAL (OUTPATIENT)
Dept: OBGYN CLINIC | Facility: CLINIC | Age: 33
End: 2018-06-25

## 2018-06-25 VITALS
WEIGHT: 173 LBS | SYSTOLIC BLOOD PRESSURE: 117 MMHG | DIASTOLIC BLOOD PRESSURE: 74 MMHG | BODY MASS INDEX: 26 KG/M2 | HEART RATE: 64 BPM

## 2018-06-25 DIAGNOSIS — Z34.83 ENCOUNTER FOR SUPERVISION OF OTHER NORMAL PREGNANCY IN THIRD TRIMESTER: Primary | ICD-10-CM

## 2018-06-25 PROCEDURE — 81002 URINALYSIS NONAUTO W/O SCOPE: CPT | Performed by: ADVANCED PRACTICE MIDWIFE

## 2018-06-26 ENCOUNTER — TELEPHONE (OUTPATIENT)
Dept: OBGYN CLINIC | Facility: CLINIC | Age: 33
End: 2018-06-26

## 2018-06-26 NOTE — TELEPHONE ENCOUNTER
Pt states she's been having bh ctx since 1p. Has been lying down x2hrs w/o resolving. When asking pt how often she is having bh ctx, pt states her belly is \"constantly hard. \" +fm when abd is not firm. Describes bh as discomfort, not pain.  Denies vag blee

## 2018-06-27 ENCOUNTER — TELEPHONE (OUTPATIENT)
Dept: OBGYN CLINIC | Facility: CLINIC | Age: 33
End: 2018-06-27

## 2018-06-27 ENCOUNTER — PATIENT MESSAGE (OUTPATIENT)
Dept: OBGYN CLINIC | Facility: CLINIC | Age: 33
End: 2018-06-27

## 2018-06-27 RX ORDER — BREAST PUMP
EACH MISCELLANEOUS
Qty: 1 EACH | Refills: 0 | Status: SHIPPED | OUTPATIENT
Start: 2018-06-27 | End: 2018-06-27 | Stop reason: CLARIF

## 2018-06-27 RX ORDER — BREAST PUMP
EACH MISCELLANEOUS
Qty: 1 EACH | Refills: 0 | Status: SHIPPED | OUTPATIENT
Start: 2018-06-27

## 2018-06-27 RX ORDER — BREAST PUMP
EACH MISCELLANEOUS
Qty: 1 EACH | Refills: 0 | OUTPATIENT
Start: 2018-06-27 | End: 2018-06-27

## 2018-06-27 NOTE — TELEPHONE ENCOUNTER
From: Claudio Gonzalez  To: Marcelle Zhu CNM  Sent: 6/27/2018 2:17 PM CDT  Subject: Prescription Question    Hi,  I would like to request a prescription for a breast pump.   The prescription can be sent either via fax: [phone] or email: intakeord

## 2018-06-27 NOTE — TELEPHONE ENCOUNTER
Noted.  Raghav Render in error to pt's pharmacy.   Rx Breast Pump faxed to [facility]  Fax # [phone] per pt request.

## 2018-07-09 ENCOUNTER — ROUTINE PRENATAL (OUTPATIENT)
Dept: OBGYN CLINIC | Facility: CLINIC | Age: 33
End: 2018-07-09

## 2018-07-09 VITALS
SYSTOLIC BLOOD PRESSURE: 121 MMHG | DIASTOLIC BLOOD PRESSURE: 78 MMHG | WEIGHT: 176 LBS | HEART RATE: 76 BPM | BODY MASS INDEX: 27 KG/M2

## 2018-07-09 DIAGNOSIS — Z34.83 ENCOUNTER FOR SUPERVISION OF OTHER NORMAL PREGNANCY IN THIRD TRIMESTER: Primary | ICD-10-CM

## 2018-07-09 LAB
APPEARANCE: CLEAR
MULTISTIX LOT#: NORMAL NUMERIC
PH, URINE: 6 (ref 4.5–8)
SPECIFIC GRAVITY: 1.02 (ref 1–1.03)
URINE-COLOR: YELLOW
UROBILINOGEN,SEMI-QN: 0 MG/DL (ref 0–1.9)

## 2018-07-09 PROCEDURE — 81002 URINALYSIS NONAUTO W/O SCOPE: CPT | Performed by: ADVANCED PRACTICE MIDWIFE

## 2018-07-09 RX ORDER — MELATONIN
325
COMMUNITY
End: 2018-08-15

## 2018-07-09 RX ORDER — THIAMINE HCL 100 MG
TABLET ORAL
COMMUNITY
End: 2018-08-15

## 2018-07-11 NOTE — PROGRESS NOTES
Lengthy debate about pregnancy weight gain. PPW adjusted by 10lbs. Discussed strategies for BP issues in labor. Recommend progressive relaxation. Reviewed danger signs.

## 2018-07-24 ENCOUNTER — ROUTINE PRENATAL (OUTPATIENT)
Dept: OBGYN CLINIC | Facility: CLINIC | Age: 33
End: 2018-07-24
Payer: COMMERCIAL

## 2018-07-24 VITALS
HEART RATE: 88 BPM | DIASTOLIC BLOOD PRESSURE: 83 MMHG | HEIGHT: 68 IN | WEIGHT: 179.5 LBS | BODY MASS INDEX: 27.2 KG/M2 | SYSTOLIC BLOOD PRESSURE: 120 MMHG

## 2018-07-24 DIAGNOSIS — Z34.83 PRENATAL CARE, SUBSEQUENT PREGNANCY, THIRD TRIMESTER: Primary | ICD-10-CM

## 2018-07-24 LAB
APPEARANCE: CLEAR
MULTISTIX LOT#: NORMAL NUMERIC
PH, URINE: 7 (ref 4.5–8)
SPECIFIC GRAVITY: 1.02 (ref 1–1.03)
URINE-COLOR: YELLOW
UROBILINOGEN,SEMI-QN: 0.2 MG/DL (ref 0–1.9)

## 2018-07-24 PROCEDURE — 81002 URINALYSIS NONAUTO W/O SCOPE: CPT | Performed by: ADVANCED PRACTICE MIDWIFE

## 2018-08-08 ENCOUNTER — ROUTINE PRENATAL (OUTPATIENT)
Dept: OBGYN CLINIC | Facility: CLINIC | Age: 33
End: 2018-08-08
Payer: COMMERCIAL

## 2018-08-08 VITALS
BODY MASS INDEX: 28.04 KG/M2 | HEART RATE: 74 BPM | SYSTOLIC BLOOD PRESSURE: 127 MMHG | HEIGHT: 68 IN | WEIGHT: 185 LBS | DIASTOLIC BLOOD PRESSURE: 79 MMHG

## 2018-08-08 DIAGNOSIS — Z34.83 PRENATAL CARE, SUBSEQUENT PREGNANCY, THIRD TRIMESTER: Primary | ICD-10-CM

## 2018-08-08 LAB
APPEARANCE: CLEAR
MULTISTIX LOT#: NORMAL NUMERIC
PH, URINE: 6 (ref 4.5–8)
SPECIFIC GRAVITY: 1.02 (ref 1–1.03)
URINE-COLOR: YELLOW
UROBILINOGEN,SEMI-QN: 0.2 MG/DL (ref 0–1.9)

## 2018-08-08 PROCEDURE — 81002 URINALYSIS NONAUTO W/O SCOPE: CPT | Performed by: ADVANCED PRACTICE MIDWIFE

## 2018-08-15 ENCOUNTER — ROUTINE PRENATAL (OUTPATIENT)
Dept: OBGYN CLINIC | Facility: CLINIC | Age: 33
End: 2018-08-15
Payer: COMMERCIAL

## 2018-08-15 VITALS
DIASTOLIC BLOOD PRESSURE: 82 MMHG | WEIGHT: 187 LBS | HEART RATE: 80 BPM | SYSTOLIC BLOOD PRESSURE: 126 MMHG | BODY MASS INDEX: 28 KG/M2

## 2018-08-15 DIAGNOSIS — Z34.83 ENCOUNTER FOR SUPERVISION OF OTHER NORMAL PREGNANCY IN THIRD TRIMESTER: Primary | ICD-10-CM

## 2018-08-15 LAB
APPEARANCE: CLEAR
MULTISTIX LOT#: NORMAL NUMERIC
PH, URINE: 7 (ref 4.5–8)
SPECIFIC GRAVITY: 1.02 (ref 1–1.03)
URINE-COLOR: YELLOW
UROBILINOGEN,SEMI-QN: 0 MG/DL (ref 0–1.9)

## 2018-08-15 PROCEDURE — 81002 URINALYSIS NONAUTO W/O SCOPE: CPT | Performed by: ADVANCED PRACTICE MIDWIFE

## 2018-08-15 NOTE — PROGRESS NOTES
Active fetus Increasing BH contractions. Denies any leaking of fluid or bleeding. Reviewed S&S labor  Warning signs reviewed  All questions answered.   Presentation confirmed on u/s

## 2018-08-24 ENCOUNTER — ROUTINE PRENATAL (OUTPATIENT)
Dept: OBGYN CLINIC | Facility: CLINIC | Age: 33
End: 2018-08-24
Payer: COMMERCIAL

## 2018-08-24 VITALS
DIASTOLIC BLOOD PRESSURE: 83 MMHG | HEIGHT: 68 IN | WEIGHT: 188.38 LBS | SYSTOLIC BLOOD PRESSURE: 122 MMHG | HEART RATE: 74 BPM | BODY MASS INDEX: 28.55 KG/M2

## 2018-08-24 DIAGNOSIS — Z34.83 PRENATAL CARE, SUBSEQUENT PREGNANCY, THIRD TRIMESTER: Primary | ICD-10-CM

## 2018-08-24 LAB
APPEARANCE: CLEAR
MULTISTIX LOT#: NORMAL NUMERIC
PH, URINE: 6 (ref 4.5–8)
SPECIFIC GRAVITY: 1.01 (ref 1–1.03)
URINE-COLOR: YELLOW
UROBILINOGEN,SEMI-QN: 0.2 MG/DL (ref 0–1.9)

## 2018-08-24 PROCEDURE — 81002 URINALYSIS NONAUTO W/O SCOPE: CPT | Performed by: ADVANCED PRACTICE MIDWIFE

## 2018-08-24 NOTE — PROGRESS NOTES
Doing well, no complaints.   Rev SOL/warnings/when to call  Birth plan reviewed:    Support:  Rodrigue  Pain management: unmedicated, hydrotherapy  VitK - Yes  EES - No  It's a girl  Peds - EC on call, Tradier

## 2018-08-31 ENCOUNTER — APPOINTMENT (OUTPATIENT)
Dept: PERINATAL CARE | Facility: HOSPITAL | Age: 33
End: 2018-08-31
Attending: NURSE PRACTITIONER
Payer: COMMERCIAL

## 2018-08-31 ENCOUNTER — ROUTINE PRENATAL (OUTPATIENT)
Dept: OBGYN CLINIC | Facility: CLINIC | Age: 33
End: 2018-08-31
Payer: COMMERCIAL

## 2018-08-31 ENCOUNTER — HOSPITAL ENCOUNTER (OUTPATIENT)
Dept: PERINATAL CARE | Facility: HOSPITAL | Age: 33
Discharge: HOME | End: 2018-08-31
Attending: ADVANCED PRACTICE MIDWIFE
Payer: COMMERCIAL

## 2018-08-31 VITALS — DIASTOLIC BLOOD PRESSURE: 69 MMHG | SYSTOLIC BLOOD PRESSURE: 115 MMHG

## 2018-08-31 VITALS
HEART RATE: 75 BPM | DIASTOLIC BLOOD PRESSURE: 78 MMHG | SYSTOLIC BLOOD PRESSURE: 131 MMHG | WEIGHT: 188 LBS | BODY MASS INDEX: 29 KG/M2

## 2018-08-31 DIAGNOSIS — O48.0 POST-DATES PREGNANCY: Primary | ICD-10-CM

## 2018-08-31 DIAGNOSIS — Z34.83 ENCOUNTER FOR SUPERVISION OF OTHER NORMAL PREGNANCY IN THIRD TRIMESTER: Primary | ICD-10-CM

## 2018-08-31 DIAGNOSIS — O48.0 POST-TERM PREGNANCY, 40-42 WEEKS OF GESTATION: ICD-10-CM

## 2018-08-31 LAB
APPEARANCE: CLEAR
MULTISTIX LOT#: NORMAL NUMERIC
PH, URINE: 5 (ref 4.5–8)
SPECIFIC GRAVITY: 1.01 (ref 1–1.03)
URINE-COLOR: YELLOW
UROBILINOGEN,SEMI-QN: 0 MG/DL (ref 0–1.9)

## 2018-08-31 PROCEDURE — 59025 FETAL NON-STRESS TEST: CPT | Performed by: ADVANCED PRACTICE MIDWIFE

## 2018-08-31 PROCEDURE — 81002 URINALYSIS NONAUTO W/O SCOPE: CPT | Performed by: ADVANCED PRACTICE MIDWIFE

## 2018-08-31 NOTE — PROGRESS NOTES
Baby active. No SOL. Discussed post dates plans. To go for NST after visit. SOL and warning signs reviewed.

## 2018-08-31 NOTE — NST
Nonstress Test   Patient: Khadar Muniz    Gestation: 40w0d    NST: post dates       Variability: Moderate           Accelerations: Yes           Decelerations: None            Baseline: 135 BPM           Uterine Irritability: No           Contractio

## 2018-09-04 ENCOUNTER — ROUTINE PRENATAL (OUTPATIENT)
Dept: OBGYN CLINIC | Facility: CLINIC | Age: 33
End: 2018-09-04
Payer: COMMERCIAL

## 2018-09-04 VITALS
BODY MASS INDEX: 29 KG/M2 | SYSTOLIC BLOOD PRESSURE: 131 MMHG | DIASTOLIC BLOOD PRESSURE: 80 MMHG | HEART RATE: 80 BPM | WEIGHT: 189.38 LBS

## 2018-09-04 DIAGNOSIS — Z34.83 ENCOUNTER FOR SUPERVISION OF OTHER NORMAL PREGNANCY IN THIRD TRIMESTER: Primary | ICD-10-CM

## 2018-09-04 PROCEDURE — 81002 URINALYSIS NONAUTO W/O SCOPE: CPT | Performed by: ADVANCED PRACTICE MIDWIFE

## 2018-09-04 NOTE — PROGRESS NOTES
Feeling well overall. Good FM. Having some trouble sleeping. No LOF, vaginal bleeding and or christina castro contractions. Discussed IOL vs expectant management risks vs benefits. Spent time discussing natural IOL vs medication options.  Patient would like to

## 2018-09-06 ENCOUNTER — ROUTINE PRENATAL (OUTPATIENT)
Dept: OBGYN CLINIC | Facility: CLINIC | Age: 33
End: 2018-09-06
Payer: COMMERCIAL

## 2018-09-06 ENCOUNTER — MOBILE ENCOUNTER (OUTPATIENT)
Dept: OBGYN CLINIC | Facility: CLINIC | Age: 33
End: 2018-09-06

## 2018-09-06 ENCOUNTER — TELEPHONE (OUTPATIENT)
Dept: OBGYN CLINIC | Facility: CLINIC | Age: 33
End: 2018-09-06

## 2018-09-06 ENCOUNTER — HOSPITAL ENCOUNTER (INPATIENT)
Facility: HOSPITAL | Age: 33
LOS: 2 days | Discharge: HOME | End: 2018-09-08
Attending: ADVANCED PRACTICE MIDWIFE | Admitting: OBSTETRICS & GYNECOLOGY
Payer: COMMERCIAL

## 2018-09-06 VITALS
WEIGHT: 192 LBS | SYSTOLIC BLOOD PRESSURE: 138 MMHG | DIASTOLIC BLOOD PRESSURE: 87 MMHG | HEART RATE: 76 BPM | BODY MASS INDEX: 29 KG/M2

## 2018-09-06 DIAGNOSIS — Z34.83 ENCOUNTER FOR SUPERVISION OF OTHER NORMAL PREGNANCY IN THIRD TRIMESTER: Primary | ICD-10-CM

## 2018-09-06 PROBLEM — O48.0 POST-TERM PREGNANCY, 40-42 WEEKS OF GESTATION: Status: ACTIVE | Noted: 2017-12-26

## 2018-09-06 LAB
ANTIBODY SCREEN: NEGATIVE
APPEARANCE: CLEAR
BASOPHILS # BLD: 0.1 K/UL (ref 0–0.2)
BASOPHILS NFR BLD: 1 %
EOSINOPHIL # BLD: 0.1 K/UL (ref 0–0.7)
EOSINOPHIL NFR BLD: 1 %
ERYTHROCYTE [DISTWIDTH] IN BLOOD BY AUTOMATED COUNT: 13.6 % (ref 11–15)
HCT VFR BLD AUTO: 40 % (ref 35–48)
HGB BLD-MCNC: 13.4 G/DL (ref 12–16)
LYMPHOCYTES # BLD: 2.5 K/UL (ref 1–4)
LYMPHOCYTES NFR BLD: 28 %
MCH RBC QN AUTO: 31.1 PG (ref 27–32)
MCHC RBC AUTO-ENTMCNC: 33.5 G/DL (ref 32–37)
MCV RBC AUTO: 92.9 FL (ref 80–100)
MONOCYTES # BLD: 0.5 K/UL (ref 0–1)
MONOCYTES NFR BLD: 6 %
MULTISTIX LOT#: NORMAL NUMERIC
NEUTROPHILS # BLD AUTO: 5.8 K/UL (ref 1.8–7.7)
NEUTROPHILS NFR BLD: 65 %
PH, URINE: 6.5 (ref 4.5–8)
PLATELET # BLD AUTO: 146 K/UL (ref 140–400)
PMV BLD AUTO: 11 FL (ref 7.4–10.3)
RBC # BLD AUTO: 4.3 M/UL (ref 3.7–5.4)
RH BLOOD TYPE: NEGATIVE
SPECIFIC GRAVITY: 1.01 (ref 1–1.03)
URINE-COLOR: YELLOW
UROBILINOGEN,SEMI-QN: 0.2 MG/DL (ref 0–1.9)
WBC # BLD AUTO: 9 K/UL (ref 4–11)

## 2018-09-06 PROCEDURE — 10907ZC DRAINAGE OF AMNIOTIC FLUID, THERAPEUTIC FROM PRODUCTS OF CONCEPTION, VIA NATURAL OR ARTIFICIAL OPENING: ICD-10-PCS | Performed by: ADVANCED PRACTICE MIDWIFE

## 2018-09-06 PROCEDURE — 59400 OBSTETRICAL CARE: CPT | Performed by: ADVANCED PRACTICE MIDWIFE

## 2018-09-06 PROCEDURE — 81002 URINALYSIS NONAUTO W/O SCOPE: CPT | Performed by: ADVANCED PRACTICE MIDWIFE

## 2018-09-06 RX ORDER — LIDOCAINE HYDROCHLORIDE 10 MG/ML
30 INJECTION, SOLUTION EPIDURAL; INFILTRATION; INTRACAUDAL; PERINEURAL ONCE
Status: DISCONTINUED | OUTPATIENT
Start: 2018-09-06 | End: 2018-09-07 | Stop reason: HOSPADM

## 2018-09-06 RX ORDER — ACETAMINOPHEN 500 MG
1000 TABLET ORAL ONCE AS NEEDED
Status: DISCONTINUED | OUTPATIENT
Start: 2018-09-06 | End: 2018-09-07 | Stop reason: HOSPADM

## 2018-09-06 RX ORDER — AMMONIA INHALANTS 0.04 G/.3ML
0.3 INHALANT RESPIRATORY (INHALATION) AS NEEDED
Status: DISCONTINUED | OUTPATIENT
Start: 2018-09-06 | End: 2018-09-07 | Stop reason: HOSPADM

## 2018-09-06 RX ORDER — 0.9 % SODIUM CHLORIDE 0.9 %
VIAL (ML) INJECTION
Status: DISPENSED
Start: 2018-09-06 | End: 2018-09-07

## 2018-09-06 RX ORDER — TERBUTALINE SULFATE 1 MG/ML
0.25 INJECTION, SOLUTION SUBCUTANEOUS AS NEEDED
Status: DISCONTINUED | OUTPATIENT
Start: 2018-09-06 | End: 2018-09-07 | Stop reason: HOSPADM

## 2018-09-06 RX ORDER — TRISODIUM CITRATE DIHYDRATE AND CITRIC ACID MONOHYDRATE 500; 334 MG/5ML; MG/5ML
30 SOLUTION ORAL AS NEEDED
Status: DISCONTINUED | OUTPATIENT
Start: 2018-09-06 | End: 2018-09-07 | Stop reason: HOSPADM

## 2018-09-06 RX ORDER — SODIUM CHLORIDE 0.9 % (FLUSH) 0.9 %
10 SYRINGE (ML) INJECTION AS NEEDED
Status: DISCONTINUED | OUTPATIENT
Start: 2018-09-06 | End: 2018-09-07 | Stop reason: HOSPADM

## 2018-09-06 NOTE — TELEPHONE ENCOUNTER
Pt states contractions started today at 3 AM.  Pt has been walking at [facility] this morning. Pt states contractions are 6-9 min apart lasting 1- 1 1/2 min and she is able to walk and talk through a contraction. Baby is active.   BOW is intac

## 2018-09-06 NOTE — PROGRESS NOTES
Contractions q 4-9min while walking. Denies bloody show or leaking fluid. +FM. Still able to talk through contractions. cx 3-4/100/-1  Early labor - advised may go to L&D now or may walk a bit more and await more active labor pattern.   To stay near [address] [facility]

## 2018-09-06 NOTE — PROGRESS NOTES
Pc from pt. Contractions started about an hour ago. Have been q 3 min for last 4 or  so contractions, active fm, no bleeding, no lof. Lives 15 min away. Do not feel like active labor contractions yet.  Advised to continue to monitor for increased intensity

## 2018-09-06 NOTE — TELEPHONE ENCOUNTER
PER PT STATE SHE'S IN LABOR / PT CALLING TO GIVE UP DATE ON CONTRACTIONS / THEY ARE 6-9 MINS / PLS ADV

## 2018-09-06 NOTE — H&P
[address] Patient Status:  Outpatient    1985 MRN [id number]   Location [address] [place] [address] Attending Steph  Day # 0 Tony Rosa MD Other [OTHER] Maternal Grandmother      cataracts   • Heart Disorder Maternal Grandfather      Social History:    Smoking status: Former Smoker     Types: Cigarettes    Quit date: 1/1/2006    Smokeless tobacco: Never Used    Alcohol use Yes  2.4 oz/week HCT 33.5 (L) 06/11/2018    06/11/2018   CREATSERUM 0.62 04/07/2017   BUN 16 04/07/2017    04/07/2017   K 4.6 04/07/2017    04/07/2017   CO2 28 04/07/2017   GLU 72 04/07/2017   CA 9.6 04/07/2017   ALB 4.4 04/07/2017   ALKPHO 85 04/07/20

## 2018-09-07 PROBLEM — D69.6 TEMPORARY LOW PLATELET COUNT (HCC): Status: ACTIVE | Noted: 2018-09-07

## 2018-09-07 LAB
ALT SERPL-CCNC: 14 U/L (ref 14–54)
AST SERPL-CCNC: 24 U/L (ref 15–41)
ERYTHROCYTE [DISTWIDTH] IN BLOOD BY AUTOMATED COUNT: 13.6 % (ref 11–15)
FETAL SCREEN RESULT: NEGATIVE
HCT VFR BLD AUTO: 37.4 % (ref 35–48)
HGB BLD-MCNC: 12.6 G/DL (ref 12–16)
MCH RBC QN AUTO: 31.1 PG (ref 27–32)
MCHC RBC AUTO-ENTMCNC: 33.6 G/DL (ref 32–37)
MCV RBC AUTO: 92.4 FL (ref 80–100)
PLATELET # BLD AUTO: 138 K/UL (ref 140–400)
PMV BLD AUTO: 11 FL (ref 7.4–10.3)
RBC # BLD AUTO: 4.05 M/UL (ref 3.7–5.4)
WBC # BLD AUTO: 12 K/UL (ref 4–11)

## 2018-09-07 RX ORDER — ONDANSETRON 2 MG/ML
4 INJECTION INTRAMUSCULAR; INTRAVENOUS EVERY 6 HOURS PRN
Status: DISCONTINUED | OUTPATIENT
Start: 2018-09-07 | End: 2018-09-08

## 2018-09-07 RX ORDER — DIAPER,BRIEF,INFANT-TODD,DISP
1 EACH MISCELLANEOUS EVERY 6 HOURS PRN
Status: DISCONTINUED | OUTPATIENT
Start: 2018-09-07 | End: 2018-09-08

## 2018-09-07 RX ORDER — BISACODYL 10 MG
10 SUPPOSITORY, RECTAL RECTAL ONCE AS NEEDED
Status: DISCONTINUED | OUTPATIENT
Start: 2018-09-07 | End: 2018-09-08

## 2018-09-07 RX ORDER — ACETAMINOPHEN 325 MG/1
650 TABLET ORAL EVERY 6 HOURS PRN
Status: DISCONTINUED | OUTPATIENT
Start: 2018-09-07 | End: 2018-09-08

## 2018-09-07 RX ORDER — IBUPROFEN 600 MG/1
600 TABLET ORAL EVERY 6 HOURS
Status: DISCONTINUED | OUTPATIENT
Start: 2018-09-07 | End: 2018-09-08

## 2018-09-07 RX ORDER — SIMETHICONE 80 MG
80 TABLET,CHEWABLE ORAL 3 TIMES DAILY PRN
Status: DISCONTINUED | OUTPATIENT
Start: 2018-09-07 | End: 2018-09-08

## 2018-09-07 RX ORDER — DOCUSATE SODIUM 100 MG/1
100 CAPSULE, LIQUID FILLED ORAL 2 TIMES DAILY
Status: DISCONTINUED | OUTPATIENT
Start: 2018-09-07 | End: 2018-09-08

## 2018-09-07 RX ORDER — PRENATAL VIT,CAL 76/IRON/FOLIC 29 MG-1 MG
1 TABLET ORAL DAILY
Status: DISCONTINUED | OUTPATIENT
Start: 2018-09-07 | End: 2018-09-08

## 2018-09-07 RX ORDER — AMMONIA INHALANTS 0.04 G/.3ML
0.3 INHALANT RESPIRATORY (INHALATION) AS NEEDED
Status: DISCONTINUED | OUTPATIENT
Start: 2018-09-07 | End: 2018-09-08

## 2018-09-07 NOTE — LACTATION NOTE
LACTATION NOTE - MOTHER      Evaluation Type: Inpatient    Problems identified  Problems identified: Milk supply WNL; Knowledge deficit  Problems Identified Other: denies    Maternal history  Maternal history: Gestational diabetes    Breastfeeding goal  Fariba

## 2018-09-07 NOTE — PROGRESS NOTES
[place] [facility]    OB/GYNE Progress Note      Emmy Shin Patient Status:  Outpatient    1985 MRN [id number]   Location [facility] 3SE Attending Beth Quintanilla, [address] [facility] Day # 0 PCP Darnell Cordero MD        Assessmen Lab Results  Component Value Date   TREPONEMALAB Negative 12/23/2016   HBVSAG Negative 05/16/2016   ABO A 09/06/2018   RH Negative 09/06/2018   WBC 12.0 (H) 09/07/2018   HGB 12.6 09/07/2018   HCT 37.4 09/07/2018    (L) 09/07/2018   CREATSERUM 0.

## 2018-09-07 NOTE — PROGRESS NOTES
[place] [facility]    Labor Progress Note    Aimee Mcdonald Patient Status:  Outpatient    1985 MRN [id number]   Location [address] [place] [address] Attending Aris Roth, [address] [facility] Day # 0 PCP Severo Child, MD during pregnancy      Reviewed options of expectant management, AROM - patient desires AROM. Encourage ambulation. Coping well  Plan discussed with patient  & spouse who verbalize understanding and agreement.         Bella Lomax  9/6/2018

## 2018-09-07 NOTE — LACTATION NOTE
This note was copied from a baby's chart.   LACTATION NOTE - INFANT    Evaluation Type  Evaluation Type: Inpatient    Problems & Assessment  Infant Assessment: Hunger cues present;Skin color: pink or appropriate for ethnicity;Good skin turgor;Oral mucous me

## 2018-09-07 NOTE — L&D DELIVERY NOTE
Landy Khan, Pending  [[id number]]    Labor Events     labor?:  No   steroids?:  None  Antibiotics received during labor?:  No  Antibiotics (enter # doses in comment):  none  Rupture date/time:  2018 2030     Rupture type:  AROM  Fluid colo Active motion    Respiratory effort Absent Weak cry; hypoventilation Good, crying               1 Minute:   5 Minute:   10 Minute:   15 Minute:   20 Minute:     Skin color:   Heart rate:   Reflex irritability:   Muscle tone:   Respiratory effort:    Total:

## 2018-09-07 NOTE — LACTATION NOTE
LACTATION NOTE - MOTHER      Evaluation Type: Inpatient    Problems identified  Problems identified: Knowledge deficit    Maternal history  Maternal history: Gestational diabetes    Breastfeeding goal  Breastfeeding goal: To maintain breast milk feeding pe

## 2018-09-08 VITALS
WEIGHT: 192 LBS | HEART RATE: 76 BPM | BODY MASS INDEX: 29.1 KG/M2 | TEMPERATURE: 98 F | RESPIRATION RATE: 14 BRPM | DIASTOLIC BLOOD PRESSURE: 66 MMHG | SYSTOLIC BLOOD PRESSURE: 130 MMHG | HEIGHT: 68 IN

## 2018-09-08 LAB
ERYTHROCYTE [DISTWIDTH] IN BLOOD BY AUTOMATED COUNT: 13.7 % (ref 11–15)
HCT VFR BLD AUTO: 35 % (ref 35–48)
HGB BLD-MCNC: 11.7 G/DL (ref 12–16)
MCH RBC QN AUTO: 31.4 PG (ref 27–32)
MCHC RBC AUTO-ENTMCNC: 33.4 G/DL (ref 32–37)
MCV RBC AUTO: 94.1 FL (ref 80–100)
PLATELET # BLD AUTO: 140 K/UL (ref 140–400)
PMV BLD AUTO: 10.6 FL (ref 7.4–10.3)
RBC # BLD AUTO: 3.72 M/UL (ref 3.7–5.4)
WBC # BLD AUTO: 8.8 K/UL (ref 4–11)

## 2018-09-08 NOTE — DISCHARGE SUMMARY
[place] [facility] - [facility]    Discharge Summary    Claudio Gonzalez Patient Status:  Inpatient    1985 MRN [id number]   Location [facility] [place] 3SE Attending Rosendo Loera, [address] Day # 2       Delivering OB Clinician: Kiana Londono

## 2018-09-08 NOTE — PLAN OF CARE
ANXIETY    • Will report anxiety at manageable levels Progressing        PAIN - ADULT    • Verbalizes/displays adequate comfort level or patient's stated pain goal Progressing        Patient Centered Care    • Patient preferences are identified and Rolin Ask

## 2018-09-08 NOTE — PROGRESS NOTES
[place] [facility] - [facility]    OB/GYNE Progress Note      Shana Saez Patient Status:  Inpatient    1985 MRN [id number]   Location [facility] 3SE Attending Westley Cameron, [address] Day # 2 PCP Kwasi Velez MD        Assessment Component Value Date    TREPONEMALAB Negative 12/23/2016    HBVSAG Negative 05/16/2016    ABO A 09/06/2018    RH Negative 09/06/2018    WBC 8.8 09/08/2018    HGB 11.7 (L) 09/08/2018    HCT 35.0 09/08/2018     09/08/2018    CREATSERUM 0.62 04/07/20

## 2018-09-19 NOTE — ADDENDUM NOTE
Encounter addended by: ZOHRA Sweeney on: 9/19/2018 10:16 AM   Actions taken: Sign clinical note, Visit diagnoses modified, Charge Capture section accepted

## 2018-10-24 ENCOUNTER — LAB ENCOUNTER (OUTPATIENT)
Dept: LAB | Facility: HOSPITAL | Age: 33
End: 2018-10-24
Attending: ADVANCED PRACTICE MIDWIFE
Payer: COMMERCIAL

## 2018-10-24 ENCOUNTER — POSTPARTUM (OUTPATIENT)
Dept: OBGYN CLINIC | Facility: CLINIC | Age: 33
End: 2018-10-24
Payer: COMMERCIAL

## 2018-10-24 VITALS
BODY MASS INDEX: 26.98 KG/M2 | DIASTOLIC BLOOD PRESSURE: 78 MMHG | WEIGHT: 178 LBS | HEIGHT: 68 IN | SYSTOLIC BLOOD PRESSURE: 118 MMHG | HEART RATE: 80 BPM

## 2018-10-24 DIAGNOSIS — R39.9 UTI SYMPTOMS: ICD-10-CM

## 2018-10-24 DIAGNOSIS — M62.89 PELVIC FLOOR DYSFUNCTION IN FEMALE: ICD-10-CM

## 2018-10-24 DIAGNOSIS — R39.9 SYMPTOMS INVOLVING URINARY SYSTEM: Primary | ICD-10-CM

## 2018-10-24 PROBLEM — O48.0 POST-TERM PREGNANCY, 40-42 WEEKS OF GESTATION: Status: RESOLVED | Noted: 2017-12-26 | Resolved: 2018-10-24

## 2018-10-24 PROCEDURE — 90471 IMMUNIZATION ADMIN: CPT | Performed by: ADVANCED PRACTICE MIDWIFE

## 2018-10-24 PROCEDURE — 87086 URINE CULTURE/COLONY COUNT: CPT | Performed by: ADVANCED PRACTICE MIDWIFE

## 2018-10-24 PROCEDURE — 90686 IIV4 VACC NO PRSV 0.5 ML IM: CPT | Performed by: ADVANCED PRACTICE MIDWIFE

## 2018-10-24 NOTE — PROGRESS NOTES
HPI:    Patient ID: Kirit Butler is a 34year old female who presents for her 6 week PP visit. Breastfeeding successfully. Pt reports increased urinary stream but no pain.  Reports some spotting but has been very active since birth ([id number] ASSESSMENT/PLAN:   Normal postpartum course  (primary encounter diagnosis)  Uti symptoms  Pelvic floor dysfunction in female    Orders Placed This Encounter      Flulaval 6 months and older, Quadrivalent, Preservative Free [[id number]]      Urine Culture, Rou

## 2018-10-29 ENCOUNTER — NURSE ONLY (OUTPATIENT)
Dept: LACTATION | Facility: HOSPITAL | Age: 33
End: 2018-10-29
Attending: ADVANCED PRACTICE MIDWIFE
Payer: COMMERCIAL

## 2018-10-29 DIAGNOSIS — Z91.89 AT RISK FOR BREASTFEEDING DIFFICULTY: Primary | ICD-10-CM

## 2018-10-29 PROCEDURE — 99213 OFFICE O/P EST LOW 20 MIN: CPT

## 2018-10-29 NOTE — PROGRESS NOTES
Mom is concerned about baby's large volume of spit up after every breastfeed which has led to a yeast skin infection under the chin and very difficult to go anywhere because so many changes of clothes are necessary.   Baby also has persistent skin rashes al structural assessment for baby from an experienced . Underlying structural issues are commonly related to feeding difficulties. Another oral exam should be done when the structural issues resolved.   Suggest doing another weight check at 3 month

## 2018-11-15 ENCOUNTER — TELEPHONE (OUTPATIENT)
Dept: OBGYN CLINIC | Facility: CLINIC | Age: 33
End: 2018-11-15

## 2018-11-20 ENCOUNTER — OFFICE VISIT (OUTPATIENT)
Dept: PHYSICAL THERAPY | Facility: HOSPITAL | Age: 33
End: 2018-11-20
Attending: ADVANCED PRACTICE MIDWIFE
Payer: COMMERCIAL

## 2018-11-20 DIAGNOSIS — M62.89 PELVIC FLOOR DYSFUNCTION IN FEMALE: ICD-10-CM

## 2018-11-20 PROCEDURE — 97161 PT EVAL LOW COMPLEX 20 MIN: CPT

## 2018-11-20 PROCEDURE — 97535 SELF CARE MNGMENT TRAINING: CPT

## 2018-11-20 PROCEDURE — 97140 MANUAL THERAPY 1/> REGIONS: CPT

## 2018-11-20 NOTE — PROGRESS NOTES
MUSCULOSKELETAL AND PELVIC FLOOR EVALUATION:   Referring Physician: Dr. Narinder Moore  Diagnosis: Pelvic floor dysfunction in female (M62.89)  Date of Onset: 9/6/18    Date of Service: 11/20/2018     PATIENT Nellie Nicholas is a 28year old y/o y/o female who presents to outpatient PT with c/o difficulty urinating, stating stream is not straight, more of spraying. States she has noticed urine sprays onto toilet bowl (anterior) when voiding, which was not present during pregnancy.     Denies GALINDO/UU chicken pox     age 10   • History of chicken pox     at age 9   • Human papilloma virus infection 3/10/16    Negative Pap, Positive HPV.   Colpo done     [employer] Screening Yes/No Comments   Age over 48 no    Bowel or bladder Dysfunction/Saddle Anesthesia Y N/A times per N/A  Amount of leakage: N/A  Do you ever leak urine without knowing it?  N/A  Pad use: no  Pad Change frequency: N/A  Fluid Intake: ~50-60 oz water/day   Bladder irritants: 1-2 8 oz cups/coffee, occasional alcoholic drink     BOWEL HABITS  Typ none    PELVIC EXAMINATION  Verbal consent given for internal examination: yes    External Observation  Mons pubis: WNL  Labia majora: WNL  Labia minora:  WNL  Urethral meatus: WNL  Introitus: WNL  Perineal body: WNL  Pelvic clock: non-TTP throughout    Int 11/20/18 - 1/15/19)  1.  Patient to be independent with progressive HEP during and upon discharge to aide with symptom management and return to previous level of function.    2. Patient to demonstrate improved PF strength to 3/5 MMT grade for at least 10 se

## 2018-11-21 ENCOUNTER — OFFICE VISIT (OUTPATIENT)
Dept: OBGYN CLINIC | Facility: CLINIC | Age: 33
End: 2018-11-21
Payer: COMMERCIAL

## 2018-11-21 VITALS
HEIGHT: 68 IN | SYSTOLIC BLOOD PRESSURE: 130 MMHG | BODY MASS INDEX: 25.69 KG/M2 | DIASTOLIC BLOOD PRESSURE: 88 MMHG | WEIGHT: 169.5 LBS | HEART RATE: 69 BPM | TEMPERATURE: 71 F

## 2018-11-21 DIAGNOSIS — Z30.430 ENCOUNTER FOR INSERTION OF INTRAUTERINE CONTRACEPTIVE DEVICE: Primary | ICD-10-CM

## 2018-11-21 DIAGNOSIS — Z30.430 ENCOUNTER FOR IUD INSERTION: ICD-10-CM

## 2018-11-21 PROCEDURE — 81025 URINE PREGNANCY TEST: CPT | Performed by: ADVANCED PRACTICE MIDWIFE

## 2018-11-21 PROCEDURE — 58300 INSERT INTRAUTERINE DEVICE: CPT | Performed by: ADVANCED PRACTICE MIDWIFE

## 2018-11-21 RX ORDER — AMOXICILLIN 500 MG/1
CAPSULE ORAL
Refills: 0 | COMMUNITY
Start: 2018-11-06 | End: 2018-12-26

## 2018-11-21 RX ORDER — COPPER 313.4 MG/1
1 INTRAUTERINE DEVICE INTRAUTERINE ONCE
Status: COMPLETED | OUTPATIENT
Start: 2018-11-21 | End: 2018-11-21

## 2018-11-21 RX ADMIN — COPPER 1 DEVICE: 313.4 INTRAUTERINE DEVICE INTRAUTERINE at 15:23:00

## 2018-11-21 NOTE — PATIENT INSTRUCTIONS
Paraguard IUD    After Paraguard insertion   Once PARAGARD is placed by your healthcare professional you shouldn’t be able to feel it. You can still use tampons. It fits securely in your uterus, so your partner shouldn’t feel anything during sex, either.

## 2018-11-21 NOTE — PROCEDURES
IUD Insertion     Pregnancy Results: negative from urine test   GC/CHL screen neg  Birth control method(s) used: condoms   Consent signed.   Procedure discussed with the patient in detail including indication, risks, benefits, alternatives and complications

## 2018-11-28 ENCOUNTER — OFFICE VISIT (OUTPATIENT)
Dept: PHYSICAL THERAPY | Facility: HOSPITAL | Age: 33
End: 2018-11-28
Attending: ADVANCED PRACTICE MIDWIFE
Payer: COMMERCIAL

## 2018-11-28 PROCEDURE — 97140 MANUAL THERAPY 1/> REGIONS: CPT

## 2018-11-28 NOTE — PROGRESS NOTES
Dx: Pelvic floor dysfunction in female (M62.89)          Authorized # of Visits/Insurance:  Rand (20 visits for calendar year) Script Dates: 11/20/18 - 1/15/19           Next MD visit: none scheduled  Referring MD: Satnam Crook  Fall Risk:  standard Sensation  Verbalized temp difference of gel: yes    Strength: 2/5 MMT grade  Endurance: 5 seconds, early onset of fatigue   Repetitions: 4 times  Quick contractions: slow speed, dyssynergia   Eccentric lengthening contraction: WNL  Bearing down Valsal

## 2018-12-03 ENCOUNTER — OFFICE VISIT (OUTPATIENT)
Dept: PHYSICAL THERAPY | Facility: HOSPITAL | Age: 33
End: 2018-12-03
Attending: ADVANCED PRACTICE MIDWIFE
Payer: COMMERCIAL

## 2018-12-03 PROCEDURE — 97112 NEUROMUSCULAR REEDUCATION: CPT

## 2018-12-03 PROCEDURE — 97140 MANUAL THERAPY 1/> REGIONS: CPT

## 2018-12-03 NOTE — PROGRESS NOTES
Dx: Pelvic floor dysfunction in female (M62.89)          Authorized # of Visits/Insurance:  Rand (20 visits for calendar year) Script Dates: 11/20/18 - 1/15/19           Next MD visit: none scheduled  Referring MD: Raina Logan  Fall Risk:  standard WNL    Obturator internus WNL WNL      Sensation  Verbalized temp difference of gel: yes    From Visit: 11/28/18  Strength: 2/5 MMT grade  Endurance: 5 seconds, early onset of fatigue   Repetitions: 4 times  Quick contractions: slow speed, dyssynergia   Ec

## 2018-12-10 ENCOUNTER — OFFICE VISIT (OUTPATIENT)
Dept: PHYSICAL THERAPY | Facility: HOSPITAL | Age: 33
End: 2018-12-10
Attending: ADVANCED PRACTICE MIDWIFE
Payer: COMMERCIAL

## 2018-12-10 PROCEDURE — 97140 MANUAL THERAPY 1/> REGIONS: CPT

## 2018-12-10 PROCEDURE — 97112 NEUROMUSCULAR REEDUCATION: CPT

## 2018-12-10 NOTE — PROGRESS NOTES
Dx: Pelvic floor dysfunction in female (M62.89)          Authorized # of Visits/Insurance:  Rand (20 visits for calendar year) Script Dates: 11/20/18 - 1/15/19           Next MD visit: none scheduled  Referring MD: Richi Kulkarni  Fall Risk:  standard WNL    Deep Transverse Perineal minimal restriction minimal restriction    Compress Urethrae/Sphincter Urethrovaginalis   Mild restriction Mild restriction    Pubococcygeus/Pubovaginalis WNL WNL    Iliococcygeus WNL WNL    Coccygeus WNL WNL    Piriformis W

## 2018-12-17 ENCOUNTER — APPOINTMENT (OUTPATIENT)
Dept: PHYSICAL THERAPY | Facility: HOSPITAL | Age: 33
End: 2018-12-17
Attending: ADVANCED PRACTICE MIDWIFE
Payer: COMMERCIAL

## 2018-12-18 NOTE — DISCHARGE SUMMARY
Patient cxl last apt on 12/18 due to being sick. States she is doing well and would like to be discharged from PT at this time.

## 2018-12-26 ENCOUNTER — OFFICE VISIT (OUTPATIENT)
Dept: OBGYN CLINIC | Facility: CLINIC | Age: 33
End: 2018-12-26
Payer: COMMERCIAL

## 2018-12-26 VITALS
HEART RATE: 69 BPM | SYSTOLIC BLOOD PRESSURE: 119 MMHG | BODY MASS INDEX: 25 KG/M2 | WEIGHT: 166 LBS | DIASTOLIC BLOOD PRESSURE: 78 MMHG

## 2018-12-26 DIAGNOSIS — Z30.431 IUD CHECK UP: Primary | ICD-10-CM

## 2018-12-26 PROCEDURE — 99213 OFFICE O/P EST LOW 20 MIN: CPT | Performed by: ADVANCED PRACTICE MIDWIFE

## 2018-12-26 NOTE — PROGRESS NOTES
Pt is here for IUD string check. Pt denies any additional cramping or bleeding. Denies any pain. P/E Limited  Uterus: non tender; not enlarged  Adnexal: bilateral without tenderness  Cervix: CMT negative  Spec exam: IUD strings visualized.   Cervix close

## 2019-01-02 ENCOUNTER — APPOINTMENT (OUTPATIENT)
Dept: PHYSICAL THERAPY | Facility: HOSPITAL | Age: 34
End: 2019-01-02
Attending: ADVANCED PRACTICE MIDWIFE
Payer: COMMERCIAL

## 2019-01-07 ENCOUNTER — APPOINTMENT (OUTPATIENT)
Dept: PHYSICAL THERAPY | Facility: HOSPITAL | Age: 34
End: 2019-01-07
Attending: ADVANCED PRACTICE MIDWIFE
Payer: COMMERCIAL

## 2019-01-14 ENCOUNTER — APPOINTMENT (OUTPATIENT)
Dept: PHYSICAL THERAPY | Facility: HOSPITAL | Age: 34
End: 2019-01-14
Attending: ADVANCED PRACTICE MIDWIFE
Payer: COMMERCIAL

## 2020-07-18 NOTE — PROGRESS NOTES
Reports intermittent pelvic pressure and rectal pressure for last 3-4 days when she is up on her feet. Reports sensations resolve when at rest.    Also feels some occasional \"pinching\" in right lower quadrant.   Denies cramping, bleeding, LOF, or increas unk

## 2024-02-27 NOTE — LACTATION NOTE
This note was copied from a baby's chart.   LACTATION NOTE - INFANT    Evaluation Type  Evaluation Type: Inpatient    Problems & Assessment  Infant Assessment: Skin color: pink or appropriate for ethnicity;Good skin turgor;Oral mucous membranes moist;Minima DISCHARGE

## (undated) DEVICE — D AND C PACK: Brand: MEDLINE INDUSTRIES, INC.

## (undated) DEVICE — SUTURE SILK 3-0 SH

## (undated) DEVICE — PENCIL ESURG 10FT 3/32IN SS

## (undated) DEVICE — SOL  .9 1000ML BTL

## (undated) DEVICE — 3M™ STERI-DRAPE™ INSTRUMENT POUCH 1018L: Brand: STERI-DRAPE™

## (undated) DEVICE — SUCTION CANISTER, 3000CC,SAFELINER: Brand: DEROYAL

## (undated) DEVICE — SUTURE VICRYL 0 UR-6

## (undated) DEVICE — 3M™ STERI-DRAPE™ U-DRAPE 1015: Brand: STERI-DRAPE™

## (undated) DEVICE — BLADE 11 SHRP BP SS SRG STRL

## (undated) DEVICE — ABSORBABLE HEMOSTAT (OXIDIZED REGENERATED CELLULOSE, U.S.P.): Brand: SURGICEL

## (undated) DEVICE — STERILE LATEX POWDER-FREE SURGICAL GLOVESWITH NITRILE COATING: Brand: PROTEXIS

## (undated) DEVICE — COVER SGL STRL LGHT HNDL BLU

## (undated) DEVICE — COTTON BALLS: Brand: DEROYAL

## (undated) DEVICE — LUBRICANT JLY SURGILUBE 2OZ

## (undated) NOTE — Clinical Note
IUP at 15w6d Normal NT ultrasound H/o LEEP since last delivery - normal CL   RECOMMENDATIONS: Continue care with Ms. Parsons Fetal anatomy and CL at ~ 20 weeks

## (undated) NOTE — MR AVS SNAPSHOT
Teri DUNCAN/ Yariel Mckeon- [facility] [place] [address]  [phone]               Thank you for choosing us for your health care visit with [address] [facility].   We are glad to serve you and happy to provide you with this ALEXANDER RIOS [address] [facility]     Visit [employer]  You can access your MyChart to more actively manage your health care and view more details from this visit by going to [email or web address].   If you've recently had

## (undated) NOTE — IP AVS SNAPSHOT
[facility]    [address], [place], [place] ~ [phone]                Discharge Summary   4/14/2017    Amelia Coyne           Admission Information        Provider Department    4/14/2017 Luis Alcantar DO Em neck. The achy feeling should go away in the next 24 hours  · To feel weak, sleepy or \"wiped out\". Your should start feeling better in the next 24 hours.    · To experience mild discomforts such as sore lip or tongue, headache, cramps, gas pains or a bloa DISCHARGE INSTRUCTIONS: AFTER YOUR SURGERY (ENGLISH)      Instructions and Information about Your Health     None      Follow-up Information     Follow up with Adarsh Lopez DO In 2 weeks.     Specialty:  Gynecology  Oncology    Contact information: harming yourself, contact [facility] at [phone]. - If you don’t have insurance, Francisco Harrison has partnered with [facility] [address] to help you get signed up for insurance coverage.   Patient [facility]anda

## (undated) NOTE — LETTER
VACCINE ADMINISTRATION RECORD  PARENT / GUARDIAN APPROVAL  Date: 2018  Vaccine administered to: Anila Bustillo     : 1985    MRN: [id number]    A copy of the appropriate Centers for Disease Control and Prevention Vaccine Information stat